# Patient Record
Sex: MALE | Race: WHITE
[De-identification: names, ages, dates, MRNs, and addresses within clinical notes are randomized per-mention and may not be internally consistent; named-entity substitution may affect disease eponyms.]

---

## 2021-02-08 ENCOUNTER — HOSPITAL ENCOUNTER (EMERGENCY)
Dept: HOSPITAL 26 - MED | Age: 81
End: 2021-02-08
Payer: MEDICARE

## 2021-02-08 VITALS — WEIGHT: 118 LBS | HEIGHT: 67 IN | BODY MASS INDEX: 18.52 KG/M2

## 2021-02-08 DIAGNOSIS — E11.22: ICD-10-CM

## 2021-02-08 DIAGNOSIS — N18.6: ICD-10-CM

## 2021-02-08 DIAGNOSIS — Z99.2: ICD-10-CM

## 2021-02-08 DIAGNOSIS — I12.0: ICD-10-CM

## 2021-02-08 DIAGNOSIS — I46.9: Primary | ICD-10-CM

## 2021-02-08 NOTE — NUR
SPOKE TO FAMILY



INFORMED BOTH DIANA AND BRITNEY OF FATHER'S PASSING. INFORMED THEM OF THE PROCESS 
TO MOVE FORWARD, THEY BOTH UNDERSTOOD THEY ARE REQUIRED TO CONTACT A MORTUARY 
OF THEIR CHOICE TO HAVE THEIR FATHERS REMAINS PICKED UP. REMAINS TO STAY HERE 
FOR TIME BEING. PROVIDED HOSPITAL CALLBACK NUMBER FOR ANY FURTHER QUESTIONS.

## 2021-02-08 NOTE — NUR
INFO-



SPOKE WITH - JESSICA CHOW. BODY IS RELEASED. CASE # [id number]. POST 
MORTEM CARE PROVIDED. PT MOVED TO OUT DOOR MORGE. CHART GIVEN TO HOUSE 
SUPERVISOR JUSTYN.

## 2021-02-08 NOTE — NUR
CONTACTED [facility], SPOKE WITH LARISSA OCASIO AND NOTIFIED HER 
ABOUT DEATH. I MADE HER AWARE THAT FAMILY HAS BEEN MADE AWARE. RECEIVED PCP 
INFORMATION. 



PMD-- RONALD FRIED

[phone]



CALLED DR. FRIED AND DID NOT SPEAK WITH HER AT THIS TIME. HER MAILBOX IS 
FULL AND UNABLE TO LEAVE A VOICEMAIL. WILL TRY TO CALL AGAIN LATER.

## 2021-02-08 NOTE — NUR
1446---Patient BIBA ALS accompanied by [facility] with CPR in progress, 
transferred to bed 10. CPR continued by [facility] staff. Dr. Lizarraga, RT and RN are 
evaluating the patient at bedside.

## 2025-08-15 NOTE — NUR
Surgical Oncology Progress Note      08/15/25      Subjective:  NAEON. Feels well overall. Denies nausea or vomiting. Tolerating diet, pain controlled. Having bowel function in ostomy. Output continues to be high from his drain. He endorses a numb sensation in his left forearm which he attributes to injury during a lab draw.     Objective    Objective:  Vital signs:   Temp:  [36.1 °C (97 °F)-37.2 °C (99 °F)] 37.2 °C (99 °F)  Heart Rate:  [78-90] 90  Resp:  [15-16] 15  BP: (101-134)/(51-69) 103/56    Physical Exam:  GEN: no acute distress  RESP: non-labored breathing on RA  CV: non-cyanotic  GI: soft, non-tender, non-distended. Thin serous fluid out of drain in bile bag. Pasty stool in ostomy. Incisions healing well, well approximated without drainage.   : voiding spontaneously  MSK: no evidence of bilateral lower extremity edema  NEURO: alert and conversant  SKIN: jaundice, warm and dry    I/O last 2 completed shifts:  In: 1690 (21 mL/kg) [Other:300; IV Piggyback:1390]  Out: 2050 (25.5 mL/kg) [Urine:1000 (0.5 mL/kg/hr); Drains:1050]  Weight: 80.5 kg      Drain output: 1750cc in 24h, serous    Labs Past 18 Hours:  No results found for this or any previous visit (from the past 18 hours).         Meds:  Current Medications[1]     Imaging:  Imaging  No results found.        Cardiology, Vascular, and Other Imaging  No other imaging results found for the past 2 days      No pertinent imaging to review.    Medications reviewed.  Vital signs reviewed.  Labs reviewed.         Assessment/Plan    Assessment and Plan:  Roma Corral is a 71 y.o. male with history of rectal cancer with mets to the liver, CAD (2021 NSTEMI), DM2, HTN  who is s/p right extended hepatectomy for metastatic rectal cancer on 7/29. He has been tolerating a normal diet. Continue trending total bilirubin and INR, as well as a consistently high drain output.    Plan Today:   - pain control with MS contin q12h, PRN dilaudid  - q6h concentrated albumin  1447---PT ARRIVED TO ED AT 1447, IN CARDIAC ARREST. PT WAS AT DIALYSIS, 
COMPLETED TREATMENT. PT BECAME UNRESPONSIVE, WITNESSED BY DIALYSIS STAFF. 
FACILITY STARTED CPR AND CALLED 911. ONTARIO FIRE ARRIVED ON SCENE, FOUND PT 
UNRESPONSIVE, APNIC, ASYSTOLE ON CARDIAC MONITOR. IO ESTABLISHED TO LEFT LOWER 
LEG BY FIRE. CPR CONTINUED, ACLS PROTOCOL CONTINUED. X3 EPI GIVEN IO, NO SHOCKS 
WERE DELIVERED. PT WAS ASYSTOLE DURING EMS CARE. PT TESTED COVID + 1/25/2021. 
PT ARRIVED TO ED, ACLS INITIATED BY ED STAFF. 



HX DIALYSIS, DM replacements  - continue regular diet with supplements  - continue PPI  - PRN zofran  - PRN Tums  - continue NAC therapy   - continue bowel reg with Miralax  - daily CBC, CMP, Mg Coags, and Phos  - continue to trend daily bilirubin and INR  - pet, music and art therapy ordered  - 500 mL albumin + zosyn    DVT ppx: SCDs and lovenox  Dispo: remain on RNF    Discussed with chief, Dr. Cookie Pereira, who discussed with attending, Dr. Erazo.    Pelon Willams MD - PGY1  Surgical Oncology   [facility] [id number]             [1]   Current Facility-Administered Medications:     acetylcysteine (Acetadote) 8 g in dextrose 5% 1,000 mL IV infusion, 100 mg/kg, intravenous, Daily, Lidia Patel MD, Stopped at 08/15/25 0102    albumin human 25 % solution 25 g, 25 g, intravenous, q6h, Cookie Pereira MD, Stopped at 08/15/25 0106    alteplase (Cathflo Activase) injection 2 mg, 2 mg, intra-catheter, PRN, Pelon Willams MD    calcium carbonate (Tums) 500 mg (200 mg elemental) chewable tablet 1 tablet, 500 mg of calcium carbonate, oral, 4x daily PRN, Matheus Torres MD, 1 tablet at 08/05/25 0447    enoxaparin (Lovenox) syringe 40 mg, 40 mg, subcutaneous, q24h, Matheus Torres MD, 40 mg at 08/14/25 1557    HYDROmorphone (Dilaudid) injection 0.2 mg, 0.2 mg, intravenous, q3h PRN, Emily Neumann MD, 0.2 mg at 08/14/25 0026    lidocaine (Xylocaine) 10 mg/mL (1 %) injection 5 mL, 5 mL, infiltration, Once, Pelon Willams MD    morphine CR (MS Contin) 12 hr tablet 30 mg, 30 mg, oral, q12h ALEX, Pelon Willams MD, 30 mg at 08/14/25 2223    naloxone (Narcan) injection 0.2 mg, 0.2 mg, intravenous, PRN, Brent Rae MD    ondansetron (Zofran) injection 4 mg, 4 mg, intravenous, q4h PRN, Matheus Torres MD, 4 mg at 07/31/25 0015    pantoprazole (Protonix) injection 40 mg, 40 mg, intravenous, Daily, Cokoie Pereira MD, 40 mg at 08/14/25 0902    piperacillin-tazobactam (Zosyn) 3.375 g in dextrose (iso) IV 50 mL, 3.375 g,  intravenous, q6h, Pelon Willams MD, Stopped at 08/14/25 2308    polyethylene glycol (Glycolax, Miralax) packet 17 g, 17 g, oral, Daily, Cookie Pereira MD, 17 g at 08/07/25 0842    Facility-Administered Medications Ordered in Other Encounters:     alteplase (Cathflo Activase) injection 2 mg, 2 mg, intra-catheter, PRN, Laurence S Castelein, APRN-CNP    alteplase (Cathflo Activase) injection 2 mg, 2 mg, intra-catheter, PRN, Laurence S Castelein, APRN-CNP    heparin flush 10 unit/mL syringe 50 Units, 50 Units, intra-catheter, PRN, Laurence S Castelein, APRN-CNP    heparin flush 10 unit/mL syringe 50 Units, 50 Units, intra-catheter, PRN, Laurence S Castelein, APRN-CNP    heparin flush 100 unit/mL syringe 500 Units, 500 Units, intra-catheter, PRN, Laurence S Castelein, APRN-CNP    heparin flush 100 unit/mL syringe 500 Units, 500 Units, intra-catheter, PRN, Laurence S Castelein, APRN-CNP